# Patient Record
Sex: MALE | Race: WHITE | NOT HISPANIC OR LATINO | Employment: FULL TIME | URBAN - METROPOLITAN AREA
[De-identification: names, ages, dates, MRNs, and addresses within clinical notes are randomized per-mention and may not be internally consistent; named-entity substitution may affect disease eponyms.]

---

## 2021-05-27 ENCOUNTER — TELEPHONE (OUTPATIENT)
Dept: DERMATOLOGY | Facility: CLINIC | Age: 56
End: 2021-05-27

## 2021-05-28 ENCOUNTER — TELEPHONE (OUTPATIENT)
Dept: DERMATOLOGY | Facility: CLINIC | Age: 56
End: 2021-05-28

## 2021-05-31 ENCOUNTER — TELEPHONE (OUTPATIENT)
Dept: DERMATOLOGY | Facility: CLINIC | Age: 56
End: 2021-05-31

## 2021-06-10 ENCOUNTER — OFFICE VISIT (OUTPATIENT)
Dept: DERMATOLOGY | Facility: CLINIC | Age: 56
End: 2021-06-10
Payer: COMMERCIAL

## 2021-06-10 VITALS
HEIGHT: 70 IN | BODY MASS INDEX: 26.48 KG/M2 | WEIGHT: 185 LBS | SYSTOLIC BLOOD PRESSURE: 148 MMHG | DIASTOLIC BLOOD PRESSURE: 93 MMHG | HEART RATE: 53 BPM

## 2021-06-10 DIAGNOSIS — C44.311 BASAL CELL CARCINOMA OF NOSE: Primary | ICD-10-CM

## 2021-06-10 PROCEDURE — 99202 PR OFFICE/OUTPT VISIT, NEW, LEVL II, 15-29 MIN: ICD-10-PCS | Mod: S$GLB,,, | Performed by: DERMATOLOGY

## 2021-06-10 PROCEDURE — 99999 PR PBB SHADOW E&M-EST. PATIENT-LVL III: CPT | Mod: PBBFAC,,, | Performed by: DERMATOLOGY

## 2021-06-10 PROCEDURE — 99202 OFFICE O/P NEW SF 15 MIN: CPT | Mod: S$GLB,,, | Performed by: DERMATOLOGY

## 2021-06-10 PROCEDURE — 99999 PR PBB SHADOW E&M-EST. PATIENT-LVL III: ICD-10-PCS | Mod: PBBFAC,,, | Performed by: DERMATOLOGY

## 2021-06-10 RX ORDER — ALPRAZOLAM 1 MG/1
TABLET, EXTENDED RELEASE ORAL DAILY
COMMUNITY

## 2021-06-10 RX ORDER — IMIQUIMOD 12.5 MG/.25G
CREAM TOPICAL
Qty: 24 PACKET | Refills: 1 | Status: SHIPPED | OUTPATIENT
Start: 2021-06-10 | End: 2021-06-15 | Stop reason: SDUPTHER

## 2021-06-10 RX ORDER — OXYCODONE HYDROCHLORIDE 15 MG/1
TABLET ORAL EVERY 4 HOURS PRN
COMMUNITY
End: 2023-04-28

## 2021-06-14 ENCOUNTER — TELEPHONE (OUTPATIENT)
Dept: DERMATOLOGY | Facility: CLINIC | Age: 56
End: 2021-06-14

## 2021-06-15 ENCOUNTER — TELEPHONE (OUTPATIENT)
Dept: DERMATOLOGY | Facility: CLINIC | Age: 56
End: 2021-06-15

## 2021-06-15 RX ORDER — IMIQUIMOD 12.5 MG/.25G
CREAM TOPICAL
Qty: 24 PACKET | Refills: 1 | Status: SHIPPED | OUTPATIENT
Start: 2021-06-15

## 2021-06-23 ENCOUNTER — TELEPHONE (OUTPATIENT)
Dept: DERMATOLOGY | Facility: CLINIC | Age: 56
End: 2021-06-23

## 2021-06-30 ENCOUNTER — OFFICE VISIT (OUTPATIENT)
Dept: DERMATOLOGY | Facility: CLINIC | Age: 56
End: 2021-06-30
Payer: COMMERCIAL

## 2021-06-30 DIAGNOSIS — C44.311 BASAL CELL CARCINOMA OF NOSE: ICD-10-CM

## 2021-06-30 DIAGNOSIS — L25.1 DERMATITIS MEDICAMENTOSA DUE TO DRUG APPLIED TO SKIN: Primary | ICD-10-CM

## 2021-06-30 DIAGNOSIS — T49.95XA DERMATITIS MEDICAMENTOSA DUE TO DRUG APPLIED TO SKIN: Primary | ICD-10-CM

## 2021-06-30 PROCEDURE — 99999 PR PBB SHADOW E&M-EST. PATIENT-LVL II: CPT | Mod: PBBFAC,,, | Performed by: DERMATOLOGY

## 2021-06-30 PROCEDURE — 99212 OFFICE O/P EST SF 10 MIN: CPT | Mod: S$GLB,,, | Performed by: DERMATOLOGY

## 2021-06-30 PROCEDURE — 99999 PR PBB SHADOW E&M-EST. PATIENT-LVL II: ICD-10-PCS | Mod: PBBFAC,,, | Performed by: DERMATOLOGY

## 2021-06-30 PROCEDURE — 99212 PR OFFICE/OUTPT VISIT, EST, LEVL II, 10-19 MIN: ICD-10-PCS | Mod: S$GLB,,, | Performed by: DERMATOLOGY

## 2021-06-30 PROCEDURE — 1126F PR PAIN SEVERITY QUANTIFIED, NO PAIN PRESENT: ICD-10-PCS | Mod: S$GLB,,, | Performed by: DERMATOLOGY

## 2021-06-30 PROCEDURE — 1126F AMNT PAIN NOTED NONE PRSNT: CPT | Mod: S$GLB,,, | Performed by: DERMATOLOGY

## 2021-07-13 ENCOUNTER — TELEPHONE (OUTPATIENT)
Dept: DERMATOLOGY | Facility: CLINIC | Age: 56
End: 2021-07-13

## 2021-07-14 ENCOUNTER — OFFICE VISIT (OUTPATIENT)
Dept: DERMATOLOGY | Facility: CLINIC | Age: 56
End: 2021-07-14
Payer: COMMERCIAL

## 2021-07-14 DIAGNOSIS — T49.95XA DERMATITIS MEDICAMENTOSA DUE TO DRUG APPLIED TO SKIN: ICD-10-CM

## 2021-07-14 DIAGNOSIS — C44.311 BASAL CELL CARCINOMA OF NOSE: Primary | ICD-10-CM

## 2021-07-14 DIAGNOSIS — L25.1 DERMATITIS MEDICAMENTOSA DUE TO DRUG APPLIED TO SKIN: ICD-10-CM

## 2021-07-14 PROCEDURE — 1126F AMNT PAIN NOTED NONE PRSNT: CPT | Mod: S$GLB,,, | Performed by: DERMATOLOGY

## 2021-07-14 PROCEDURE — 99999 PR PBB SHADOW E&M-EST. PATIENT-LVL II: ICD-10-PCS | Mod: PBBFAC,,, | Performed by: DERMATOLOGY

## 2021-07-14 PROCEDURE — 99999 PR PBB SHADOW E&M-EST. PATIENT-LVL II: CPT | Mod: PBBFAC,,, | Performed by: DERMATOLOGY

## 2021-07-14 PROCEDURE — 1126F PR PAIN SEVERITY QUANTIFIED, NO PAIN PRESENT: ICD-10-PCS | Mod: S$GLB,,, | Performed by: DERMATOLOGY

## 2021-07-14 PROCEDURE — 99212 OFFICE O/P EST SF 10 MIN: CPT | Mod: S$GLB,,, | Performed by: DERMATOLOGY

## 2021-07-14 PROCEDURE — 99212 PR OFFICE/OUTPT VISIT, EST, LEVL II, 10-19 MIN: ICD-10-PCS | Mod: S$GLB,,, | Performed by: DERMATOLOGY

## 2021-08-02 ENCOUNTER — TELEPHONE (OUTPATIENT)
Dept: DERMATOLOGY | Facility: CLINIC | Age: 56
End: 2021-08-02

## 2021-08-26 ENCOUNTER — OFFICE VISIT (OUTPATIENT)
Dept: DERMATOLOGY | Facility: CLINIC | Age: 56
End: 2021-08-26
Payer: COMMERCIAL

## 2021-08-26 VITALS — RESPIRATION RATE: 18 BRPM | WEIGHT: 184.94 LBS | BODY MASS INDEX: 26.48 KG/M2 | HEIGHT: 70 IN

## 2021-08-26 DIAGNOSIS — C44.311 BASAL CELL CARCINOMA OF NOSE: Primary | ICD-10-CM

## 2021-08-26 PROCEDURE — 1160F PR REVIEW ALL MEDS BY PRESCRIBER/CLIN PHARMACIST DOCUMENTED: ICD-10-PCS | Mod: CPTII,S$GLB,, | Performed by: DERMATOLOGY

## 2021-08-26 PROCEDURE — 3008F BODY MASS INDEX DOCD: CPT | Mod: CPTII,S$GLB,, | Performed by: DERMATOLOGY

## 2021-08-26 PROCEDURE — 1126F AMNT PAIN NOTED NONE PRSNT: CPT | Mod: CPTII,S$GLB,, | Performed by: DERMATOLOGY

## 2021-08-26 PROCEDURE — 1160F RVW MEDS BY RX/DR IN RCRD: CPT | Mod: CPTII,S$GLB,, | Performed by: DERMATOLOGY

## 2021-08-26 PROCEDURE — 1126F PR PAIN SEVERITY QUANTIFIED, NO PAIN PRESENT: ICD-10-PCS | Mod: CPTII,S$GLB,, | Performed by: DERMATOLOGY

## 2021-08-26 PROCEDURE — 99999 PR PBB SHADOW E&M-EST. PATIENT-LVL III: ICD-10-PCS | Mod: PBBFAC,,, | Performed by: DERMATOLOGY

## 2021-08-26 PROCEDURE — 99212 OFFICE O/P EST SF 10 MIN: CPT | Mod: S$GLB,,, | Performed by: DERMATOLOGY

## 2021-08-26 PROCEDURE — 3008F PR BODY MASS INDEX (BMI) DOCUMENTED: ICD-10-PCS | Mod: CPTII,S$GLB,, | Performed by: DERMATOLOGY

## 2021-08-26 PROCEDURE — 1159F MED LIST DOCD IN RCRD: CPT | Mod: CPTII,S$GLB,, | Performed by: DERMATOLOGY

## 2021-08-26 PROCEDURE — 99212 PR OFFICE/OUTPT VISIT, EST, LEVL II, 10-19 MIN: ICD-10-PCS | Mod: S$GLB,,, | Performed by: DERMATOLOGY

## 2021-08-26 PROCEDURE — 99999 PR PBB SHADOW E&M-EST. PATIENT-LVL III: CPT | Mod: PBBFAC,,, | Performed by: DERMATOLOGY

## 2021-08-26 PROCEDURE — 1159F PR MEDICATION LIST DOCUMENTED IN MEDICAL RECORD: ICD-10-PCS | Mod: CPTII,S$GLB,, | Performed by: DERMATOLOGY

## 2021-10-28 ENCOUNTER — OFFICE VISIT (OUTPATIENT)
Dept: DERMATOLOGY | Facility: CLINIC | Age: 56
End: 2021-10-28
Payer: COMMERCIAL

## 2021-10-28 DIAGNOSIS — C44.311 BASAL CELL CARCINOMA OF NOSE: Primary | ICD-10-CM

## 2021-10-28 DIAGNOSIS — T49.95XA DERMATITIS MEDICAMENTOSA DUE TO DRUG APPLIED TO SKIN: ICD-10-CM

## 2021-10-28 DIAGNOSIS — L25.1 DERMATITIS MEDICAMENTOSA DUE TO DRUG APPLIED TO SKIN: ICD-10-CM

## 2021-10-28 PROCEDURE — 1159F PR MEDICATION LIST DOCUMENTED IN MEDICAL RECORD: ICD-10-PCS | Mod: CPTII,S$GLB,, | Performed by: DERMATOLOGY

## 2021-10-28 PROCEDURE — 1160F RVW MEDS BY RX/DR IN RCRD: CPT | Mod: CPTII,S$GLB,, | Performed by: DERMATOLOGY

## 2021-10-28 PROCEDURE — 99213 OFFICE O/P EST LOW 20 MIN: CPT | Mod: S$GLB,,, | Performed by: DERMATOLOGY

## 2021-10-28 PROCEDURE — 1159F MED LIST DOCD IN RCRD: CPT | Mod: CPTII,S$GLB,, | Performed by: DERMATOLOGY

## 2021-10-28 PROCEDURE — 99999 PR PBB SHADOW E&M-EST. PATIENT-LVL II: CPT | Mod: PBBFAC,,, | Performed by: DERMATOLOGY

## 2021-10-28 PROCEDURE — 99213 PR OFFICE/OUTPT VISIT, EST, LEVL III, 20-29 MIN: ICD-10-PCS | Mod: S$GLB,,, | Performed by: DERMATOLOGY

## 2021-10-28 PROCEDURE — 99999 PR PBB SHADOW E&M-EST. PATIENT-LVL II: ICD-10-PCS | Mod: PBBFAC,,, | Performed by: DERMATOLOGY

## 2021-10-28 PROCEDURE — 1160F PR REVIEW ALL MEDS BY PRESCRIBER/CLIN PHARMACIST DOCUMENTED: ICD-10-PCS | Mod: CPTII,S$GLB,, | Performed by: DERMATOLOGY

## 2021-11-18 ENCOUNTER — OFFICE VISIT (OUTPATIENT)
Dept: DERMATOLOGY | Facility: CLINIC | Age: 56
End: 2021-11-18
Payer: COMMERCIAL

## 2021-11-18 ENCOUNTER — TELEPHONE (OUTPATIENT)
Dept: DERMATOLOGY | Facility: CLINIC | Age: 56
End: 2021-11-18
Payer: COMMERCIAL

## 2021-11-18 DIAGNOSIS — C44.311 BASAL CELL CARCINOMA OF LEFT SIDE OF NOSE: Primary | ICD-10-CM

## 2021-11-18 DIAGNOSIS — T49.95XA DERMATITIS MEDICAMENTOSA DUE TO DRUG APPLIED TO SKIN: ICD-10-CM

## 2021-11-18 DIAGNOSIS — L25.1 DERMATITIS MEDICAMENTOSA DUE TO DRUG APPLIED TO SKIN: ICD-10-CM

## 2021-11-18 PROCEDURE — 99999 PR PBB SHADOW E&M-EST. PATIENT-LVL II: CPT | Mod: PBBFAC,,, | Performed by: DERMATOLOGY

## 2021-11-18 PROCEDURE — 1159F PR MEDICATION LIST DOCUMENTED IN MEDICAL RECORD: ICD-10-PCS | Mod: CPTII,S$GLB,, | Performed by: DERMATOLOGY

## 2021-11-18 PROCEDURE — 1160F PR REVIEW ALL MEDS BY PRESCRIBER/CLIN PHARMACIST DOCUMENTED: ICD-10-PCS | Mod: CPTII,S$GLB,, | Performed by: DERMATOLOGY

## 2021-11-18 PROCEDURE — 1160F RVW MEDS BY RX/DR IN RCRD: CPT | Mod: CPTII,S$GLB,, | Performed by: DERMATOLOGY

## 2021-11-18 PROCEDURE — 99999 PR PBB SHADOW E&M-EST. PATIENT-LVL II: ICD-10-PCS | Mod: PBBFAC,,, | Performed by: DERMATOLOGY

## 2021-11-18 PROCEDURE — 99213 OFFICE O/P EST LOW 20 MIN: CPT | Mod: S$GLB,,, | Performed by: DERMATOLOGY

## 2021-11-18 PROCEDURE — 1159F MED LIST DOCD IN RCRD: CPT | Mod: CPTII,S$GLB,, | Performed by: DERMATOLOGY

## 2021-11-18 PROCEDURE — 99213 PR OFFICE/OUTPT VISIT, EST, LEVL III, 20-29 MIN: ICD-10-PCS | Mod: S$GLB,,, | Performed by: DERMATOLOGY

## 2022-01-13 ENCOUNTER — OFFICE VISIT (OUTPATIENT)
Dept: DERMATOLOGY | Facility: CLINIC | Age: 57
End: 2022-01-13
Payer: COMMERCIAL

## 2022-01-13 DIAGNOSIS — L21.9 SEBORRHEIC DERMATITIS: ICD-10-CM

## 2022-01-13 DIAGNOSIS — C44.311 BASAL CELL CARCINOMA OF NOSE: Primary | ICD-10-CM

## 2022-01-13 PROCEDURE — 1159F MED LIST DOCD IN RCRD: CPT | Mod: CPTII,S$GLB,, | Performed by: DERMATOLOGY

## 2022-01-13 PROCEDURE — 99212 OFFICE O/P EST SF 10 MIN: CPT | Mod: S$GLB,,, | Performed by: DERMATOLOGY

## 2022-01-13 PROCEDURE — 99999 PR PBB SHADOW E&M-EST. PATIENT-LVL III: CPT | Mod: PBBFAC,,, | Performed by: DERMATOLOGY

## 2022-01-13 PROCEDURE — 99999 PR PBB SHADOW E&M-EST. PATIENT-LVL III: ICD-10-PCS | Mod: PBBFAC,,, | Performed by: DERMATOLOGY

## 2022-01-13 PROCEDURE — 99212 PR OFFICE/OUTPT VISIT, EST, LEVL II, 10-19 MIN: ICD-10-PCS | Mod: S$GLB,,, | Performed by: DERMATOLOGY

## 2022-01-13 PROCEDURE — 1160F RVW MEDS BY RX/DR IN RCRD: CPT | Mod: CPTII,S$GLB,, | Performed by: DERMATOLOGY

## 2022-01-13 PROCEDURE — 1160F PR REVIEW ALL MEDS BY PRESCRIBER/CLIN PHARMACIST DOCUMENTED: ICD-10-PCS | Mod: CPTII,S$GLB,, | Performed by: DERMATOLOGY

## 2022-01-13 PROCEDURE — 1159F PR MEDICATION LIST DOCUMENTED IN MEDICAL RECORD: ICD-10-PCS | Mod: CPTII,S$GLB,, | Performed by: DERMATOLOGY

## 2022-01-13 NOTE — PROGRESS NOTES
CHIEF COMPLAINT: followup treatment with topical imiquimod    HISTORY OF PRESENT ILLNESS:  Location(s): left nose/cheek  Duration: has been applying the medication for 6 weeks total; see last note below  Quality: much better now  Context: still has some medication    REVIEW OF SYSTEMS:  Skin: has some scaling to beard area    EXAMINATION:  Skin: see photo below  Some mild residual erythema and scaling; some to beard and eyebrow areas; more consistent with seborrheic dermatitis today        ASSESSMENT:   status post treatment of superficial basal cell carcinoma with imiquimod  Seborrheic dermatitis    PLAN:  Current status and management options, and risks, benefits, and alternatives were discussed.  Restart application of imiquimod  Trial nizoral shampoo otc to face for seborrheic dermatitis   Followup 2 weeks  --------------------------------------  Note: Some or all of this note may have been generated using voice recognition software. There may be voice recognition errors including grammatical and/or spelling errors found in the text. Attempts were made to correct these errors prior to signature. .     ============================================================  PREVIOUS NOTE(S):  Note of 11/18/2021     CHIEF COMPLAINT: followup treatment with topical imiquimod    HISTORY OF PRESENT ILLNESS:  Location(s): left nose  Duration: has been applying the medication for 4 weeks this cycle; prior use was 2 weeks  Quality: some irritation  Context: prior biopsy showed superficial basal cell carcinoma   Has a good bit of medicine left    EXAMINATION:  Skin: see photo; eczematous changes with some crusting as noted in the photo          ASSESSMENT:   Typical dermatitis due to application of imiquimod, as expected  superficial basal cell carcinoma     PLAN:  Current status and management options, and risks, benefits, and alternatives were discussed.  discontinue application of imiquimod  Followup 6 weeks  Will consider  restart then  --------------------------------------  Note: Some or all of this note may have been generated using voice recognition software. There may be voice recognition errors including grammatical and/or spelling errors found in the text. Attempts were made to correct these errors prior to signature. .     ============================================================  PREVIOUS NOTE(S):  Note of 10/28      CHIEF COMPLAINT: followup treatment with topical imiquimod    HISTORY OF PRESENT ILLNESS:  Location(s): left lower nose  Duration: has been applying the medication for one week this round; used 2 weeks previously before irritation forced him to stop     Quality: some irritation, itching  Context: applying medication QD    REVIEW OF SYSTEMS:  General: no fever, chills, flu symptoms    EXAMINATION:  Skin: see photo; some inflammation as anticipated      Prior photo      ASSESSMENT:   Typical dermatitis due to application of imiquimod, as expected  Less severe response so far this round  superficial basal cell carcinoma     PLAN:  Current status and management options, and risks, benefits, and alternatives were discussed.  continue application of imiquimod  Reviewed expected course  Followup 3 weeks; call prn sooner  --------------------------------------  Note: Some or all of this note may have been generated using voice recognition software. There may be voice recognition errors including grammatical and/or spelling errors found in the text. Attempts were made to correct these errors prior to signature. .       ============================================================  PREVIOUS NOTE(S):  Note of 8/26   CHIEF COMPLAINT: followup treatment with topical imiquimod to area of superficial basal cell carcinoma    HISTORY OF PRESENT ILLNESS:  Location(s): left lower nose  Duration: applied about 3 weeks; stopped about 3 weeks ago  Quality: better now  Context: see last note; prior path showed superficial basal cell  carcinoma     EXAMINATION:  Skin: there is some mild residual erythema to the left nose/cheek as noted in the photo    Photo today:       Photo from patient       ASSESSMENT:   Some residual erythema to area of application of imiquimod to superficial basal cell carcinoma     PLAN:  Current status and management options, and risks, benefits, and alternatives were discussed.  hold application of imiquimod  Followup end of October  Restart imiquimod 2 weeks prior to appt  --------------------------------------  Note: Some or all of this note may have been generated using voice recognition software. There may be voice recognition errors including grammatical and/or spelling errors found in the text. Attempts were made to correct these errors prior to signature. .

## 2022-01-27 ENCOUNTER — OFFICE VISIT (OUTPATIENT)
Dept: DERMATOLOGY | Facility: CLINIC | Age: 57
End: 2022-01-27
Payer: COMMERCIAL

## 2022-01-27 DIAGNOSIS — L25.1 DERMATITIS MEDICAMENTOSA DUE TO DRUG APPLIED TO SKIN: Primary | ICD-10-CM

## 2022-01-27 DIAGNOSIS — T49.95XA DERMATITIS MEDICAMENTOSA DUE TO DRUG APPLIED TO SKIN: Primary | ICD-10-CM

## 2022-01-27 PROCEDURE — 99999 PR PBB SHADOW E&M-EST. PATIENT-LVL III: ICD-10-PCS | Mod: PBBFAC,,, | Performed by: DERMATOLOGY

## 2022-01-27 PROCEDURE — 99212 PR OFFICE/OUTPT VISIT, EST, LEVL II, 10-19 MIN: ICD-10-PCS | Mod: S$GLB,,, | Performed by: DERMATOLOGY

## 2022-01-27 PROCEDURE — 99212 OFFICE O/P EST SF 10 MIN: CPT | Mod: S$GLB,,, | Performed by: DERMATOLOGY

## 2022-01-27 PROCEDURE — 99999 PR PBB SHADOW E&M-EST. PATIENT-LVL III: CPT | Mod: PBBFAC,,, | Performed by: DERMATOLOGY

## 2022-01-27 PROCEDURE — 1159F MED LIST DOCD IN RCRD: CPT | Mod: CPTII,S$GLB,, | Performed by: DERMATOLOGY

## 2022-01-27 PROCEDURE — 1159F PR MEDICATION LIST DOCUMENTED IN MEDICAL RECORD: ICD-10-PCS | Mod: CPTII,S$GLB,, | Performed by: DERMATOLOGY

## 2022-01-27 PROCEDURE — 1160F PR REVIEW ALL MEDS BY PRESCRIBER/CLIN PHARMACIST DOCUMENTED: ICD-10-PCS | Mod: CPTII,S$GLB,, | Performed by: DERMATOLOGY

## 2022-01-27 PROCEDURE — 1160F RVW MEDS BY RX/DR IN RCRD: CPT | Mod: CPTII,S$GLB,, | Performed by: DERMATOLOGY

## 2022-01-27 NOTE — PROGRESS NOTES
CHIEF COMPLAINT: followup treatment with topical imiquimod    HISTORY OF PRESENT ILLNESS:  Location(s): nose  Duration: has been applying the medication for 2 weeks  Quality: some irritation; not as bad a prior use  Context: see last note  Has a good bit left      EXAMINATION:  Skin: see photo          ASSESSMENT:   Typical dermatitis due to application of imiquimod, as expected    PLAN:  Current status and management options, and risks, benefits, and alternatives were discussed.  continue application of imiquimod  Followup 3 weeks  --------------------------------------  Note: Some or all of this note may have been generated using voice recognition software. There may be voice recognition errors including grammatical and/or spelling errors found in the text. Attempts were made to correct these errors prior to signature.   ============================================================  PREVIOUS NOTE(S):  Note of 1/13   CHIEF COMPLAINT: followup treatment with topical imiquimod    HISTORY OF PRESENT ILLNESS:  Location(s): left nose/cheek  Duration: has been applying the medication for 6 weeks total; see last note below  Quality: much better now  Context: still has some medication    REVIEW OF SYSTEMS:  Skin: has some scaling to beard area    EXAMINATION:  Skin: see photo below  Some mild residual erythema and scaling; some to beard and eyebrow areas; more consistent with seborrheic dermatitis today        ASSESSMENT:   status post treatment of superficial basal cell carcinoma with imiquimod  Seborrheic dermatitis    PLAN:  Current status and management options, and risks, benefits, and alternatives were discussed.  Restart application of imiquimod  Trial nizoral shampoo otc to face for seborrheic dermatitis   Followup 2 weeks  --------------------------------------  Note: Some or all of this note may have been generated using voice recognition software. There may be voice recognition errors including grammatical and/or  spelling errors found in the text. Attempts were made to correct these errors prior to signature. .     ============================================================  PREVIOUS NOTE(S):  Note of 11/18/2021     CHIEF COMPLAINT: followup treatment with topical imiquimod    HISTORY OF PRESENT ILLNESS:  Location(s): left nose  Duration: has been applying the medication for 4 weeks this cycle; prior use was 2 weeks  Quality: some irritation  Context: prior biopsy showed superficial basal cell carcinoma   Has a good bit of medicine left    EXAMINATION:  Skin: see photo; eczematous changes with some crusting as noted in the photo          ASSESSMENT:   Typical dermatitis due to application of imiquimod, as expected  superficial basal cell carcinoma     PLAN:  Current status and management options, and risks, benefits, and alternatives were discussed.  discontinue application of imiquimod  Followup 6 weeks  Will consider restart then  --------------------------------------  Note: Some or all of this note may have been generated using voice recognition software. There may be voice recognition errors including grammatical and/or spelling errors found in the text. Attempts were made to correct these errors prior to signature. .     ============================================================  PREVIOUS NOTE(S):  Note of 10/28      CHIEF COMPLAINT: followup treatment with topical imiquimod    HISTORY OF PRESENT ILLNESS:  Location(s): left lower nose  Duration: has been applying the medication for one week this round; used 2 weeks previously before irritation forced him to stop     Quality: some irritation, itching  Context: applying medication QD    REVIEW OF SYSTEMS:  General: no fever, chills, flu symptoms    EXAMINATION:  Skin: see photo; some inflammation as anticipated      Prior photo      ASSESSMENT:   Typical dermatitis due to application of imiquimod, as expected  Less severe response so far this round  superficial basal  cell carcinoma     PLAN:  Current status and management options, and risks, benefits, and alternatives were discussed.  continue application of imiquimod  Reviewed expected course  Followup 3 weeks; call prn sooner  --------------------------------------  Note: Some or all of this note may have been generated using voice recognition software. There may be voice recognition errors including grammatical and/or spelling errors found in the text. Attempts were made to correct these errors prior to signature. .       ============================================================  PREVIOUS NOTE(S):  Note of 8/26   CHIEF COMPLAINT: followup treatment with topical imiquimod to area of superficial basal cell carcinoma    HISTORY OF PRESENT ILLNESS:  Location(s): left lower nose  Duration: applied about 3 weeks; stopped about 3 weeks ago  Quality: better now  Context: see last note; prior path showed superficial basal cell carcinoma     EXAMINATION:  Skin: there is some mild residual erythema to the left nose/cheek as noted in the photo    Photo today:       Photo from patient       ASSESSMENT:   Some residual erythema to area of application of imiquimod to superficial basal cell carcinoma     PLAN:  Current status and management options, and risks, benefits, and alternatives were discussed.  hold application of imiquimod  Followup end of October  Restart imiquimod 2 weeks prior to appt  --------------------------------------  Note: Some or all of this note may have been generated using voice recognition software. There may be voice recognition errors including grammatical and/or spelling errors found in the text. Attempts were made to correct these errors prior to signature. .

## 2022-02-07 ENCOUNTER — TELEPHONE (OUTPATIENT)
Dept: DERMATOLOGY | Facility: CLINIC | Age: 57
End: 2022-02-07
Payer: COMMERCIAL

## 2022-02-07 NOTE — TELEPHONE ENCOUNTER
Called patient and left a message that is was ok to stop the medication and he can start it back up in March, when he starts to call us so we can schedule a 2 weeks follow up. Bonnie

## 2022-02-07 NOTE — TELEPHONE ENCOUNTER
----- Message from Primo Fraire MD sent at 2/7/2022  2:03 PM CST -----  Yes, but he's using imiquimod  ----- Message -----  From: Huong Lemus, CST  Sent: 2/7/2022  10:34 AM CST  To: Huong Lemus, CST, Primo Fraire MD    Patient called said he stopped the efudex because he was very sick with the flu. He was using it for 3 weeks, he wants to start it in a month is that ok.

## 2022-02-28 ENCOUNTER — TELEPHONE (OUTPATIENT)
Dept: DERMATOLOGY | Facility: CLINIC | Age: 57
End: 2022-02-28
Payer: COMMERCIAL

## 2022-02-28 NOTE — TELEPHONE ENCOUNTER
Patient called and said that he is starting back on his efudex tomorrow and I gave him an appointment for a two weeks follow up. 3-15-22

## 2022-03-15 ENCOUNTER — OFFICE VISIT (OUTPATIENT)
Dept: DERMATOLOGY | Facility: CLINIC | Age: 57
End: 2022-03-15
Payer: COMMERCIAL

## 2022-03-15 DIAGNOSIS — T49.95XA DERMATITIS MEDICAMENTOSA DUE TO DRUG APPLIED TO SKIN: Primary | ICD-10-CM

## 2022-03-15 DIAGNOSIS — L25.1 DERMATITIS MEDICAMENTOSA DUE TO DRUG APPLIED TO SKIN: Primary | ICD-10-CM

## 2022-03-15 PROCEDURE — 99212 OFFICE O/P EST SF 10 MIN: CPT | Mod: S$GLB,,, | Performed by: DERMATOLOGY

## 2022-03-15 PROCEDURE — 1159F MED LIST DOCD IN RCRD: CPT | Mod: CPTII,S$GLB,, | Performed by: DERMATOLOGY

## 2022-03-15 PROCEDURE — 99999 PR PBB SHADOW E&M-EST. PATIENT-LVL III: CPT | Mod: PBBFAC,,, | Performed by: DERMATOLOGY

## 2022-03-15 PROCEDURE — 99212 PR OFFICE/OUTPT VISIT, EST, LEVL II, 10-19 MIN: ICD-10-PCS | Mod: S$GLB,,, | Performed by: DERMATOLOGY

## 2022-03-15 PROCEDURE — 99999 PR PBB SHADOW E&M-EST. PATIENT-LVL III: ICD-10-PCS | Mod: PBBFAC,,, | Performed by: DERMATOLOGY

## 2022-03-15 PROCEDURE — 1160F RVW MEDS BY RX/DR IN RCRD: CPT | Mod: CPTII,S$GLB,, | Performed by: DERMATOLOGY

## 2022-03-15 PROCEDURE — 1159F PR MEDICATION LIST DOCUMENTED IN MEDICAL RECORD: ICD-10-PCS | Mod: CPTII,S$GLB,, | Performed by: DERMATOLOGY

## 2022-03-15 PROCEDURE — 1160F PR REVIEW ALL MEDS BY PRESCRIBER/CLIN PHARMACIST DOCUMENTED: ICD-10-PCS | Mod: CPTII,S$GLB,, | Performed by: DERMATOLOGY

## 2022-03-15 NOTE — PROGRESS NOTES
aCHIEF COMPLAINT: followup treatment with topical imiquimod    HISTORY OF PRESENT ILLNESS:  Location(s): nose  Duration: has been applying the medication for 2 weeks this round  Quality: minimal irritation, slight crusting, some erythema  Context: applying medication QD  See previous notes below    EXAMINATION:  Skin: there is some erythema with some areas of superficial crusting; see photo below        Photo from 06/2021, showing site of original biopsy= superficial basal cell carcinoma             ASSESSMENT:   Typical dermatitis due to application of imiquimod, as expected    PLAN:  Current status and management options, and risks, benefits, and alternatives were discussed.  discontinue application of imiquimod  Followup 3 months  Call prn sooner  --------------------------------------  Note: Some or all of this note may have been generated using voice recognition software. There may be voice recognition errors including grammatical and/or spelling errors found in the text. Attempts were made to correct these errors prior to signature. .   ============================================================  PREVIOUS NOTE(S):  Note of 1/27       CHIEF COMPLAINT: followup treatment with topical imiquimod    HISTORY OF PRESENT ILLNESS:  Location(s): nose  Duration: has been applying the medication for 2 weeks  Quality: some irritation; not as bad a prior use  Context: see last note  Has a good bit left      EXAMINATION:  Skin: see photo          ASSESSMENT:   Typical dermatitis due to application of imiquimod, as expected    PLAN:  Current status and management options, and risks, benefits, and alternatives were discussed.  continue application of imiquimod  Followup 3 weeks  --------------------------------------  Note: Some or all of this note may have been generated using voice recognition software. There may be voice recognition errors including grammatical and/or spelling errors found in the text. Attempts were made  to correct these errors prior to signature.   ============================================================  PREVIOUS NOTE(S):  Note of 1/13   CHIEF COMPLAINT: followup treatment with topical imiquimod    HISTORY OF PRESENT ILLNESS:  Location(s): left nose/cheek  Duration: has been applying the medication for 6 weeks total; see last note below  Quality: much better now  Context: still has some medication    REVIEW OF SYSTEMS:  Skin: has some scaling to beard area    EXAMINATION:  Skin: see photo below  Some mild residual erythema and scaling; some to beard and eyebrow areas; more consistent with seborrheic dermatitis today        ASSESSMENT:   status post treatment of superficial basal cell carcinoma with imiquimod  Seborrheic dermatitis    PLAN:  Current status and management options, and risks, benefits, and alternatives were discussed.  Restart application of imiquimod  Trial nizoral shampoo otc to face for seborrheic dermatitis   Followup 2 weeks  --------------------------------------  Note: Some or all of this note may have been generated using voice recognition software. There may be voice recognition errors including grammatical and/or spelling errors found in the text. Attempts were made to correct these errors prior to signature. .     ============================================================  PREVIOUS NOTE(S):  Note of 11/18/2021     CHIEF COMPLAINT: followup treatment with topical imiquimod    HISTORY OF PRESENT ILLNESS:  Location(s): left nose  Duration: has been applying the medication for 4 weeks this cycle; prior use was 2 weeks  Quality: some irritation  Context: prior biopsy showed superficial basal cell carcinoma   Has a good bit of medicine left    EXAMINATION:  Skin: see photo; eczematous changes with some crusting as noted in the photo          ASSESSMENT:   Typical dermatitis due to application of imiquimod, as expected  superficial basal cell carcinoma     PLAN:  Current status and  management options, and risks, benefits, and alternatives were discussed.  discontinue application of imiquimod  Followup 6 weeks  Will consider restart then  --------------------------------------  Note: Some or all of this note may have been generated using voice recognition software. There may be voice recognition errors including grammatical and/or spelling errors found in the text. Attempts were made to correct these errors prior to signature. .     ============================================================  PREVIOUS NOTE(S):  Note of 10/28      CHIEF COMPLAINT: followup treatment with topical imiquimod    HISTORY OF PRESENT ILLNESS:  Location(s): left lower nose  Duration: has been applying the medication for one week this round; used 2 weeks previously before irritation forced him to stop     Quality: some irritation, itching  Context: applying medication QD    REVIEW OF SYSTEMS:  General: no fever, chills, flu symptoms    EXAMINATION:  Skin: see photo; some inflammation as anticipated      Prior photo      ASSESSMENT:   Typical dermatitis due to application of imiquimod, as expected  Less severe response so far this round  superficial basal cell carcinoma     PLAN:  Current status and management options, and risks, benefits, and alternatives were discussed.  continue application of imiquimod  Reviewed expected course  Followup 3 weeks; call prn sooner  --------------------------------------  Note: Some or all of this note may have been generated using voice recognition software. There may be voice recognition errors including grammatical and/or spelling errors found in the text. Attempts were made to correct these errors prior to signature. .       ============================================================  PREVIOUS NOTE(S):  Note of 8/26   CHIEF COMPLAINT: followup treatment with topical imiquimod to area of superficial basal cell carcinoma    HISTORY OF PRESENT ILLNESS:  Location(s): left lower  nose  Duration: applied about 3 weeks; stopped about 3 weeks ago  Quality: better now  Context: see last note; prior path showed superficial basal cell carcinoma     EXAMINATION:  Skin: there is some mild residual erythema to the left nose/cheek as noted in the photo    Photo today:       Photo from patient       ASSESSMENT:   Some residual erythema to area of application of imiquimod to superficial basal cell carcinoma     PLAN:  Current status and management options, and risks, benefits, and alternatives were discussed.  hold application of imiquimod  Followup end of October  Restart imiquimod 2 weeks prior to appt  --------------------------------------  Note: Some or all of this note may have been generated using voice recognition software. There may be voice recognition errors including grammatical and/or spelling errors found in the text. Attempts were made to correct these errors prior to signature. .

## 2022-04-29 ENCOUNTER — TELEPHONE (OUTPATIENT)
Dept: DERMATOLOGY | Facility: CLINIC | Age: 57
End: 2022-04-29
Payer: COMMERCIAL

## 2022-06-23 ENCOUNTER — TELEPHONE (OUTPATIENT)
Dept: DERMATOLOGY | Facility: CLINIC | Age: 57
End: 2022-06-23
Payer: COMMERCIAL

## 2022-06-30 ENCOUNTER — OFFICE VISIT (OUTPATIENT)
Dept: DERMATOLOGY | Facility: CLINIC | Age: 57
End: 2022-06-30
Payer: COMMERCIAL

## 2022-06-30 DIAGNOSIS — C44.311 BASAL CELL CARCINOMA OF NOSE: Primary | ICD-10-CM

## 2022-06-30 PROCEDURE — 1159F PR MEDICATION LIST DOCUMENTED IN MEDICAL RECORD: ICD-10-PCS | Mod: CPTII,S$GLB,, | Performed by: DERMATOLOGY

## 2022-06-30 PROCEDURE — 1159F MED LIST DOCD IN RCRD: CPT | Mod: CPTII,S$GLB,, | Performed by: DERMATOLOGY

## 2022-06-30 PROCEDURE — 99999 PR PBB SHADOW E&M-EST. PATIENT-LVL II: CPT | Mod: PBBFAC,,, | Performed by: DERMATOLOGY

## 2022-06-30 PROCEDURE — 99999 PR PBB SHADOW E&M-EST. PATIENT-LVL II: ICD-10-PCS | Mod: PBBFAC,,, | Performed by: DERMATOLOGY

## 2022-06-30 PROCEDURE — 1160F PR REVIEW ALL MEDS BY PRESCRIBER/CLIN PHARMACIST DOCUMENTED: ICD-10-PCS | Mod: CPTII,S$GLB,, | Performed by: DERMATOLOGY

## 2022-06-30 PROCEDURE — 99212 PR OFFICE/OUTPT VISIT, EST, LEVL II, 10-19 MIN: ICD-10-PCS | Mod: S$GLB,,, | Performed by: DERMATOLOGY

## 2022-06-30 PROCEDURE — 1160F RVW MEDS BY RX/DR IN RCRD: CPT | Mod: CPTII,S$GLB,, | Performed by: DERMATOLOGY

## 2022-06-30 PROCEDURE — 99212 OFFICE O/P EST SF 10 MIN: CPT | Mod: S$GLB,,, | Performed by: DERMATOLOGY

## 2022-06-30 NOTE — PROGRESS NOTES
CHIEF COMPLAINT: followup treatment with topical imiquimod    HISTORY OF PRESENT ILLNESS:  Location(s): nose  Duration: total use 8+ weeks  Quality: better  Context: see previous notes below  Had a very brisk reaction to initial course  Prior biopsy showed superficial basal cell carcinoma left nose    EXAMINATION:  Skin: there is no evidence of residual superficial basal cell carcinoma to the area today    ASSESSMENT:   status post treatment with imiquimod to left nose for superficial basal cell carcinoma     PLAN:  Current status and management options, and risks, benefits, and alternatives were discussed.  Followup to Dr. Levine in 3-4 months;  PRN to me      --------------------------------------  Note: Some or all of this note may have been generated using voice recognition software. There may be voice recognition errors including grammatical and/or spelling errors found in the text. Attempts were made to correct these errors prior to signature. .     ============================================================  PREVIOUS NOTE(S):  Note of 3/15     CHIEF COMPLAINT: followup treatment with topical imiquimod    HISTORY OF PRESENT ILLNESS:  Location(s): nose  Duration: has been applying the medication for 2 weeks this round  Quality: minimal irritation, slight crusting, some erythema  Context: applying medication QD  See previous notes below    EXAMINATION:  Skin: there is some erythema with some areas of superficial crusting; see photo below        Photo from 06/2021, showing site of original biopsy= superficial basal cell carcinoma             ASSESSMENT:   Typical dermatitis due to application of imiquimod, as expected    PLAN:  Current status and management options, and risks, benefits, and alternatives were discussed.  discontinue application of imiquimod  Followup 3 months  Call prn sooner  --------------------------------------  Note: Some or all of this note may have been generated using voice recognition  software. There may be voice recognition errors including grammatical and/or spelling errors found in the text. Attempts were made to correct these errors prior to signature. .   ============================================================  PREVIOUS NOTE(S):  Note of 1/27       CHIEF COMPLAINT: followup treatment with topical imiquimod    HISTORY OF PRESENT ILLNESS:  Location(s): nose  Duration: has been applying the medication for 2 weeks  Quality: some irritation; not as bad a prior use  Context: see last note  Has a good bit left      EXAMINATION:  Skin: see photo          ASSESSMENT:   Typical dermatitis due to application of imiquimod, as expected    PLAN:  Current status and management options, and risks, benefits, and alternatives were discussed.  continue application of imiquimod  Followup 3 weeks  --------------------------------------  Note: Some or all of this note may have been generated using voice recognition software. There may be voice recognition errors including grammatical and/or spelling errors found in the text. Attempts were made to correct these errors prior to signature.   ============================================================  PREVIOUS NOTE(S):  Note of 1/13   CHIEF COMPLAINT: followup treatment with topical imiquimod    HISTORY OF PRESENT ILLNESS:  Location(s): left nose/cheek  Duration: has been applying the medication for 6 weeks total; see last note below  Quality: much better now  Context: still has some medication    REVIEW OF SYSTEMS:  Skin: has some scaling to beard area    EXAMINATION:  Skin: see photo below  Some mild residual erythema and scaling; some to beard and eyebrow areas; more consistent with seborrheic dermatitis today        ASSESSMENT:   status post treatment of superficial basal cell carcinoma with imiquimod  Seborrheic dermatitis    PLAN:  Current status and management options, and risks, benefits, and alternatives were discussed.  Restart application of  imiquimod  Trial nizoral shampoo otc to face for seborrheic dermatitis   Followup 2 weeks  --------------------------------------  Note: Some or all of this note may have been generated using voice recognition software. There may be voice recognition errors including grammatical and/or spelling errors found in the text. Attempts were made to correct these errors prior to signature. .     ============================================================  PREVIOUS NOTE(S):  Note of 11/18/2021     CHIEF COMPLAINT: followup treatment with topical imiquimod    HISTORY OF PRESENT ILLNESS:  Location(s): left nose  Duration: has been applying the medication for 4 weeks this cycle; prior use was 2 weeks  Quality: some irritation  Context: prior biopsy showed superficial basal cell carcinoma   Has a good bit of medicine left    EXAMINATION:  Skin: see photo; eczematous changes with some crusting as noted in the photo          ASSESSMENT:   Typical dermatitis due to application of imiquimod, as expected  superficial basal cell carcinoma     PLAN:  Current status and management options, and risks, benefits, and alternatives were discussed.  discontinue application of imiquimod  Followup 6 weeks  Will consider restart then  --------------------------------------  Note: Some or all of this note may have been generated using voice recognition software. There may be voice recognition errors including grammatical and/or spelling errors found in the text. Attempts were made to correct these errors prior to signature. .     ============================================================  PREVIOUS NOTE(S):  Note of 10/28      CHIEF COMPLAINT: followup treatment with topical imiquimod    HISTORY OF PRESENT ILLNESS:  Location(s): left lower nose  Duration: has been applying the medication for one week this round; used 2 weeks previously before irritation forced him to stop     Quality: some irritation, itching  Context: applying medication  QD    REVIEW OF SYSTEMS:  General: no fever, chills, flu symptoms    EXAMINATION:  Skin: see photo; some inflammation as anticipated      Prior photo      ASSESSMENT:   Typical dermatitis due to application of imiquimod, as expected  Less severe response so far this round  superficial basal cell carcinoma     PLAN:  Current status and management options, and risks, benefits, and alternatives were discussed.  continue application of imiquimod  Reviewed expected course  Followup 3 weeks; call prn sooner  --------------------------------------  Note: Some or all of this note may have been generated using voice recognition software. There may be voice recognition errors including grammatical and/or spelling errors found in the text. Attempts were made to correct these errors prior to signature. .       ============================================================  PREVIOUS NOTE(S):  Note of 8/26   CHIEF COMPLAINT: followup treatment with topical imiquimod to area of superficial basal cell carcinoma    HISTORY OF PRESENT ILLNESS:  Location(s): left lower nose  Duration: applied about 3 weeks; stopped about 3 weeks ago  Quality: better now  Context: see last note; prior path showed superficial basal cell carcinoma     EXAMINATION:  Skin: there is some mild residual erythema to the left nose/cheek as noted in the photo    Photo today:       Photo from patient       ASSESSMENT:   Some residual erythema to area of application of imiquimod to superficial basal cell carcinoma     PLAN:  Current status and management options, and risks, benefits, and alternatives were discussed.  hold application of imiquimod  Followup end of October  Restart imiquimod 2 weeks prior to appt  --------------------------------------  Note: Some or all of this note may have been generated using voice recognition software. There may be voice recognition errors including grammatical and/or spelling errors found in the text. Attempts were made to  correct these errors prior to signature. .